# Patient Record
Sex: FEMALE
[De-identification: names, ages, dates, MRNs, and addresses within clinical notes are randomized per-mention and may not be internally consistent; named-entity substitution may affect disease eponyms.]

---

## 2017-11-12 ENCOUNTER — HEALTH MAINTENANCE LETTER (OUTPATIENT)
Age: 32
End: 2017-11-12

## 2020-03-02 ENCOUNTER — HEALTH MAINTENANCE LETTER (OUTPATIENT)
Age: 35
End: 2020-03-02

## 2020-12-14 ENCOUNTER — HEALTH MAINTENANCE LETTER (OUTPATIENT)
Age: 35
End: 2020-12-14

## 2021-04-18 ENCOUNTER — HEALTH MAINTENANCE LETTER (OUTPATIENT)
Age: 36
End: 2021-04-18

## 2021-10-02 ENCOUNTER — HEALTH MAINTENANCE LETTER (OUTPATIENT)
Age: 36
End: 2021-10-02

## 2022-05-14 ENCOUNTER — HEALTH MAINTENANCE LETTER (OUTPATIENT)
Age: 37
End: 2022-05-14

## 2022-09-03 ENCOUNTER — HEALTH MAINTENANCE LETTER (OUTPATIENT)
Age: 37
End: 2022-09-03

## 2023-06-03 ENCOUNTER — HEALTH MAINTENANCE LETTER (OUTPATIENT)
Age: 38
End: 2023-06-03

## 2024-08-15 LAB
EXTERNAL HEPATITIS B SURFACE ANTIGEN: NEGATIVE
EXTERNAL HEPATITIS C AB: NEGATIVE
EXTERNAL RUBELLA QUALITATIVE: NORMAL
EXTERNAL SYPHILIS RPR SCREEN: NORMAL
HIV1 P24 AG SERPL QL IA: NORMAL

## 2024-09-27 ENCOUNTER — CLINICAL SUPPORT (OUTPATIENT)
Dept: GENETICS | Facility: HOSPITAL | Age: 39
End: 2024-09-27
Payer: COMMERCIAL

## 2024-09-27 ENCOUNTER — OFFICE VISIT (OUTPATIENT)
Dept: OBSTETRICS AND GYNECOLOGY | Facility: CLINIC | Age: 39
End: 2024-09-27
Payer: COMMERCIAL

## 2024-09-27 ENCOUNTER — HOSPITAL ENCOUNTER (OUTPATIENT)
Dept: ULTRASOUND IMAGING | Facility: HOSPITAL | Age: 39
Discharge: HOME | End: 2024-09-27
Payer: COMMERCIAL

## 2024-09-27 ENCOUNTER — TRANSCRIBE ORDERS (OUTPATIENT)
Dept: ULTRASOUND IMAGING | Facility: HOSPITAL | Age: 39
End: 2024-09-27
Payer: COMMERCIAL

## 2024-09-27 VITALS
BODY MASS INDEX: 24.56 KG/M2 | DIASTOLIC BLOOD PRESSURE: 73 MMHG | HEART RATE: 82 BPM | HEIGHT: 63 IN | SYSTOLIC BLOOD PRESSURE: 120 MMHG | TEMPERATURE: 98.7 F | WEIGHT: 138.6 LBS

## 2024-09-27 DIAGNOSIS — O28.5 ABNORMAL GENETIC TEST DURING PREGNANCY: ICD-10-CM

## 2024-09-27 DIAGNOSIS — D82.1 DIGEORGE SYNDROME: ICD-10-CM

## 2024-09-27 DIAGNOSIS — O09.529 ANTEPARTUM MULTIGRAVIDA OF ADVANCED MATERNAL AGE: Primary | ICD-10-CM

## 2024-09-27 DIAGNOSIS — R89.8 ABNORMAL GENETIC TEST: ICD-10-CM

## 2024-09-27 DIAGNOSIS — O28.5 ABNORMAL GENETIC TEST DURING PREGNANCY: Primary | ICD-10-CM

## 2024-09-27 DIAGNOSIS — R89.8 ABNORMAL GENETIC TEST: Primary | ICD-10-CM

## 2024-09-27 PROCEDURE — 76801 OB US < 14 WKS SINGLE FETUS: CPT

## 2024-09-27 PROCEDURE — 96040: CPT

## 2024-09-27 RX ORDER — LEVOTHYROXINE SODIUM 150 UG/1
137 TABLET ORAL DAILY
COMMUNITY
Start: 2024-09-17 | End: 2025-09-17

## 2024-09-27 NOTE — PROGRESS NOTES
[facility]  Genetic Counseling Note    Patient Name:  Patrick Leslie  :   1985  KAREL:   2024  MRN:   [id number]  Patient's Age at REGULO: 39 years    Requesting Physician: Jeni Longo MD and Edith Vallecillo MD                                        Referring Diagnosis:  Patrick Leslie is a 39 y.o. female. Patrick is here in consultation for a prenatal diagnosis of abnormal NIPT result: high risk for 22q11.2 deletion.     Pregnancy history:  Estimated Date of Delivery: 3/24/25            GA:14w4d  M[facility]ann pregnancy  Female fetus    OB History          3    Para   2    Term   2            AB        Living   2         SAB        IAB        Ectopic        Molar        Multiple   0    Live Births   2              Ms. Leslie reports no use of fertility treatments, gamete donors, or assistive reproductive technologies to conceive this pregnancy.     Patient medical history:   No past medical history on file.  Ms. Leslie reports no history of diabetes or infertility.     She was diagnosed with papillary thyroid cancer in 2018, and had a thyroidectomy.    Medications:   Current Outpatient Medications   Medication Sig Dispense Refill    ibuprofen (ADVIL,MOTRIN) 800 MG tablet Take 1 tablet by mouth Every 8 (Eight) Hours As Needed for mild pain (1-3). 30 tablet 3    Prenatal Vit-Fe Fumarate-FA (PRENATAL, CLASSIC, VITAMIN) 28-0.8 MG tablet tablet Take 1 tablet by mouth Daily.       No current facility-administered medications for this visit.     Exposures/complications:  Alcohol: no  Cigarettes: no   X-Rays/CT/radiation: no  Illnesses: no  Spotting/bleeding: no  Other exposures: no    Genetic/lab testing already completed during current pregnancy:  Cell free DNA screening: Yes - Tashia Lemus   High risk for 22q11.2 deletion syndrome: 1 in 2 risk (see lab result below)  Low risk for trisomy 21, 13, 18, monosomy X, triploidy  Carrier Screening: no  Amniocentesis: No  Glucose  screening: no  Other testing (CVS, maternal serum screen, etc): no        Imaging:   Ultrasound  Date: 9/27/24, 14w4d gestation  Impression  Live viable early intrauterine pregnancy.  Anterior placenta.  MVP 3.2 cm, which is normal.  EFW 92 g (AC 43%)  Limited early anatomy appears normal including what can be seen of profile, heart, and thymus.    Psychosocial History:   Psychosocial assessment: Ms. Leslie received the NIPT results earlier this week and was understandably upset to learn the pregnancy was at risk for 22q deletion. She carefully explored her options, especially given the uncertainty in the situation. Because the positive predictive value of the test is 50%, and we cannot predict how severe the symptoms would be for an affected child, there are a lot of unknowns at this point. We discussed that amniocentesis would give a certain answer about whether the fetus has 22q deletion, but there is a possibility of further uncertain results from microarray. Ms. Leslie discussed how she wants to plan for her son's futures, given that they have limited local family support. The results of diagnostic testing would help the patient make plans, including whether to continue the pregnancy.                                                        Family History:    A three-generation family history was obtained for the patient and the father of the baby. See attached pedigree.     Of significance, pt has two healthy siblings, each with children. Father with heart disease and hypertension. Mother with hypertension. Maternal first cousin with developmental delay (speech and cognition affected) and possible cerebral palsy.     The father of the baby, Evangelina, is a 47 year old male with a history of hypertention. He has one healthy brother. Father passed away at age 67 due to a subarachnoid hemorrhage; previous history of diabetes and hypertension. Mother with meningioma; thyroid cancer in 40's or 50's and s/p  thyroidectomy.    Maternal race/ethnicity: /  Paternal race/ethnicity: /  Consanguinity: denied      We do not have medical records regarding any of these diagnoses. The history was otherwise unremarkable for heart defects, immune deficiency, cleft palate, developmental delays or intellectual disability, other birth defects, neural tube defects, multiple miscarriages, stillborns or infant deaths, other known genetic conditions and consanguinity.                        Information Discussed:   1) NIPT Results  We discussed Ms. Leslie's NIPT results, which showed that there is a 50% likelihood that the pregnancy is affected by 22q11.2 deletion syndrome.     Non-invasive prenatal testing (NIPT) is a blood test that can report the risk for a fetus to be affected with various conditions. NIPT analyzes cell free DNA from the placenta that is circulating in maternal blood. Cell free NIPT is a screening test. NIPT does not provide a diagnosis of a genetic condition. Occasionally, placental DNA is different from fetal DNA due to confined placental mosaicism. False negatives and false positives are possible.    Confirmatory diagnostic testing should be offered to every patient. Confirmatory testing can be performed during pregnancy or following delivery. Options for diagnostic testing during pregnancy include amniocentesis followed by genetic testing of fetal samples.    2) 22q11.2 Deletion Syndrome  22q11.2 deletion syndrome is caused by a deletion of a small piece of chromosome 22. It is also known by other names, including 22q, DiGeorge syndrome, and velocardiofacial (VCFS) syndrome.  Around 1 in 2,000 people have 22q.     Causes of 22q  22q11.2 deletion syndrome occurs because a part of the DNA is missing. Typically, an individual has 23 pairs of chromosomes, for a total of 46 chromosomes. Each chromosome has a short arm, called p, and a long arm, called q. If someone has too much or too little  genetic material on the chromosomes, it can cause health problems. In 22q11.2 deletion syndrome, there is a piece of genetic material on the long arm (q) of chromosome 22 that has been deleted. This deletion causes the typical health concerns associated with 22q.    The exact area of chromosome 22 that is deleted can vary. The edges of the deletion can be determined by microarray testing. For most people with 22q, around 40 genes are missing.    There is nothing that parents did to cause their baby to have 22q. The deletion occurs randomly in the egg or sperm cell. 90% of the time, the deletion is new in the patient (de lance). 10% of the time, the deletion is inherited from a parent. Sometimes, people who have 22q may not know that they are affected until their child is diagnosed.    Maternal age is not a risk factor for having a pregnancy affected by 22q.    Symptoms  The features of 22q vary for each person, even within the same family. Each person with 22q is different. We can make some predictions about their health, but we cannot predict exactly what their life will be like.    Common features of 22q include  Congenital heart defects, especially conotruncal malformations including truncus arteriosus and tetralogy of Fallot (64% of individuals)  Cleft palate and other palatal findings (67% of individuals)  Immune deficiency (77% of individuals)  Characteristic facial features  Developmental delay and learning difficulties (70-90% of individuals)  Hearing loss  Low calcium levels in the blood  Feeding difficulties  Genitourinary tract/kidney anomalies (16% of individuals)    People with 22q are more likely to have psychiatric concerns and autoimmune disorders. 22q can also cause GI concerns, eye differences, and central nervous system abnormalities.    Resources  International 22q11.2 Foundation - www.22q.org     3) Genetic Testing  Diagnostic testing is available prenatally with amniocentesis followed by  microarray. Microarray is a genetic test that detects copy number variants (CNVs), which are deleted or duplicated sections of DNA. Microarray can detect aneuploidy, triploidy, and small deletions or duplications. Microarray does not detect balanced chromosomal rearrangements. Microarray can reveal regions of homozygosity. Regions of homozygosity could indicate uniparental disomy or shared parental ancestry, or suggest a region for further investigation due to the potential for unmasking recessive alleles.  A microarray can return positive, negative, or uncertain results. Positive results indicate that a pathogenic or likely pathogenic variant was detected. Negative, or normal, results indicate that no pathogenic or likely pathogenic variants were detected. Uncertain results indicate that one or more variants of uncertain significance (VUS) were detected. A VUS is a variant that lacks sufficient evidence to classify it as either pathogenic or benign. In the future, VUS results may be reclassified to pathogenic or benign.   Turnaround time for microarray is approximately 10-21 calendar days. Testing may be delayed due to specimen type and culturing requirements.    An amniocentesis is a procedure for diagnostic fetal genetic testing or to measure certain analyte levels. Amniocentesis can be performed after 15-16 weeks of pregnancy, when the chorion and amnion have fused. During an amniocentesis, a provider inserts a needle through the pregnant parent's abdomen to collect amniotic fluid. Amniotic fluid contains fetal cells and other components that can give information about fetal health. Ultrasound is used to guide the positioning of the needle. The risk of complications, including interruption of pregnancy and infection following an amniocentesis is approximately 1 in 900.   Patrick Leslie is still considering amniocentesis at this time.  If genetic testing is not pursued during pregnancy, genetic testing after  the baby's birth is recommended to inform healthcare decisions.    Follow-up Plan:    1) Ms. Leslie will consider amniocentesis, and would like to get on the schedule for the procedure.   2) If she elects to pursue amniocentesis, chromosomal microarray is recommended. She did not decide today whether or not she would like FISH.  3) If diagnostic genetic testing is not pursued during pregnancy, genetic testing after the baby's birth is recommended. Chromosomal microarray should be ordered.    Please feel free to contact me with additional questions at [phone].    I personally spent 35 minutes in face-to-face time with this patient. I provided genetic counseling services, including obtaining a structured family history, analysis of genetic and other risks, counseling of the patient and her family regarding abnormal NIPT results, review of medical information, review of previous test results and discussion of genetic testing options.    Sue Davis MS, [facility]  Genetic Counselor  [facility]

## 2024-09-30 ENCOUNTER — TELEPHONE (OUTPATIENT)
Dept: OBGYN | Facility: CLINIC | Age: 39
End: 2024-09-30
Payer: COMMERCIAL

## 2024-09-30 NOTE — TELEPHONE ENCOUNTER
Assessment:    Telma Amos    When was the first day of your last period? 24 - pt has had abnormal NIPT with amniocentesis scheduled 10/9.  Pt lives in [place] - Pt records via care everywhere.      Massiel Qureshi RN     Sent to  [facility]

## 2024-10-01 DIAGNOSIS — D82.1 DIGEORGE SYNDROME: Primary | ICD-10-CM

## 2024-10-03 ENCOUNTER — TRANSCRIBE ORDERS (OUTPATIENT)
Dept: ULTRASOUND IMAGING | Facility: HOSPITAL | Age: 39
End: 2024-10-03
Payer: COMMERCIAL

## 2024-10-03 DIAGNOSIS — R89.8 ABNORMAL GENETIC TEST: Primary | ICD-10-CM

## 2024-10-09 ENCOUNTER — OFFICE VISIT (OUTPATIENT)
Dept: OBSTETRICS AND GYNECOLOGY | Facility: CLINIC | Age: 39
End: 2024-10-09
Payer: COMMERCIAL

## 2024-10-09 ENCOUNTER — HOSPITAL ENCOUNTER (OUTPATIENT)
Dept: ULTRASOUND IMAGING | Facility: HOSPITAL | Age: 39
Discharge: HOME | End: 2024-10-09
Admitting: OBSTETRICS & GYNECOLOGY
Payer: COMMERCIAL

## 2024-10-09 VITALS
SYSTOLIC BLOOD PRESSURE: 117 MMHG | TEMPERATURE: 98.4 F | WEIGHT: 140.8 LBS | OXYGEN SATURATION: 99 % | HEART RATE: 98 BPM | DIASTOLIC BLOOD PRESSURE: 67 MMHG | HEIGHT: 63 IN | BODY MASS INDEX: 24.95 KG/M2

## 2024-10-09 DIAGNOSIS — D82.1 DIGEORGE SYNDROME: ICD-10-CM

## 2024-10-09 DIAGNOSIS — O09.529 ANTEPARTUM MULTIGRAVIDA OF ADVANCED MATERNAL AGE: ICD-10-CM

## 2024-10-09 DIAGNOSIS — O28.5 ABNORMAL GENETIC TEST DURING PREGNANCY: Primary | ICD-10-CM

## 2024-10-09 DIAGNOSIS — R89.8 ABNORMAL GENETIC TEST: ICD-10-CM

## 2024-10-09 PROCEDURE — 59000 AMNIOCENTESIS DIAGNOSTIC: CPT

## 2024-10-09 PROCEDURE — 76946 ECHO GUIDE FOR AMNIOCENTESIS: CPT

## 2024-10-09 PROCEDURE — 76805 OB US >/= 14 WKS SNGL FETUS: CPT

## 2024-10-09 RX ORDER — SERTRALINE HYDROCHLORIDE 25 MG/1
25 TABLET, FILM COATED ORAL
COMMUNITY
Start: 2024-10-07

## 2024-10-09 NOTE — PROGRESS NOTES
Pt reports that she is doing well and denies vaginal bleeding, cramping, contractions or LOF at this time. Not feeling fetal movement at this point in pregnancy. Reviewed when to call OB office or present to L&D for evaluation with symptoms such as decreased fetal movement, vaginal bleeding, LOF or ctxs. Pt verbalized understanding. Denies HA, visual changes or epigastric pain. Denies any additional complaints at time of appointment. Next OB appointment scheduled for: early November.     Patrick is undecided on ultrasound guided amniocentesis at this time. Would like to speak with Dr. Longo before making a decision.     Vitals:    10/09/24 1215   BP: 117/67   Pulse: 98   Temp: 98.4 °F (36.9 °C)   SpO2: 99%

## 2024-10-09 NOTE — PROGRESS NOTES
Patient decided to proceed with ultrasound guided amniocentesis with Dr. Longo. Consent forms signed and patient given copy of amniocentesis post instructions. Patient ambulated out of [facility] off with  with no issues or concerns.

## 2024-10-09 NOTE — PROGRESS NOTES
MATERNAL FETAL MEDICINE Consult Note    Dear Dr Edith Vallecillo MD:    Thank you for your kind referral of Patrick Leslie.  As you know, she is a 39 y.o.   at 16w2 d.  (Estimated Date of Delivery: 3/24/25). This is a consult.      Her antepartum course is complicated by:  +DiGeorge on NIPT  AMA    Aneuploidy Screening: +22q (1in2)    HPI: Today, she denies headache, blurry vision, RUQ pain. No vaginal bleeding, no contractions.     Review of History:  Past Medical History:   Diagnosis Date    Migraine     Polycystic ovary syndrome     Thyroid cancer 2018    Follows with Dr. Edwige Christine at [facility]     Past Surgical History:   Procedure Laterality Date    THYROIDECTOMY  2018       Social History     Socioeconomic History    Marital status:    Tobacco Use    Smoking status: Never     Passive exposure: Never   Vaping Use    Vaping status: Never Used   Substance and Sexual Activity    Alcohol use: No    Drug use: No    Sexual activity: Yes     Partners: Male     Family History   Problem Relation Age of Onset    Hypertension Mother     Hypertension Father     Coronary artery disease Father     Heart disease Father       Allergies   Allergen Reactions    Latex Rash     Category: Allergy;      Current Outpatient Medications on File Prior to Visit   Medication Sig Dispense Refill    levothyroxine (SYNTHROID, LEVOTHROID) 150 MCG tablet Take 137 mcg by mouth Daily.      Prenatal Vit-Fe Fumarate-FA (PRENATAL, CLASSIC, VITAMIN) 28-0.8 MG tablet tablet Take 1 tablet by mouth Daily.      sertraline (ZOLOFT) 25 MG tablet 1 tablet. HAS NOT STARTED YET      [DISCONTINUED] ibuprofen (ADVIL,MOTRIN) 800 MG tablet Take 1 tablet by mouth Every 8 (Eight) Hours As Needed for mild pain (1-3). 30 tablet 3     No current facility-administered medications on file prior to visit.        Past obstetric, gynecological, medical, surgical, family and social history reviewed.  Relevant lab work and imaging reviewed.    Review of  "systems  Constitutional:  denies fever, chills, malaise.   ENT/Mouth:  denies sore throat, tinnitus  Eyes: denies vision changes/pain  CV:  denies chest pain  Respiratory:  denies cough/SOB  GI:  denies N/V, diarrhea, abdominal pain.    :   denies dysuria  Skin:  denies lesions or pruritus   Neuro:  denies weakness, focal neurologic symptoms    Vitals:    10/09/24 1215   BP: 117/67   BP Location: Right arm   Patient Position: Sitting   Pulse: 98   Temp: 98.4 °F (36.9 °C)   TempSrc: Temporal   SpO2: 99%   Weight: 63.9 kg (140 lb 12.8 oz)   Height: 160 cm (63\")       PHYSICAL EXAM   GENERAL: Not in acute distress, AAOx3, pleasant  CARDIO: regular rate and rhythm  PULM: symmetric chest rise, speaking in complete sentences without difficulty  NEURO: awake, alert and oriented to person, place, and time  ABDOMINAL: No fundal tenderness, no rebound or guarding, gravid  EXTREMITIES: no bilateral lower extremity edema/tenderness  SKIN: Warm, well-perfused      ULTRASOUND   Please view full ultrasound note on Imaging tab in ViewPoint.  Breech presentation.  Anterior placenta.   MVP 4.5 cm, which is normal.    g (AC 33%)  Normal appearing limited early anatomy.  Visualized heart views appear normal.   Amniocentesis performed under ultrasound guidance without complication.      ASSESSMENT/COUNSELIN y.o.   at 16w2d.  (Estimated Date of Delivery: 3/24/25).       -Pregnancy  [ X ] stable  [   ] improving [  ] worsening    Diagnoses and all orders for this visit:    1. Abnormal genetic test during pregnancy (Primary)    2. DiGeorge syndrome    3. Antepartum multigravida of advanced maternal age      22q/DiGeorge syndrome suspected on NIPT:  Previously counseled by both me and Sue.    Progress Notes by Sue Davis (2024 14:30)     We revisited amnio today.  Patient was torn but ultimately decided for her anxiety she needed to know one way or another.  She said unless there is a severe abnormality " seen with ultrasound, it would not change their management and they planned on continuing the pregnancy.  We discussed further diagnostic testing with amniocentesis which has a 1/900 risk of bleeding, infection, PPROM/fetal loss.  It is a safe procedure and we are happy to do it if she desires. She does desire this today so it was done.     Amniocentesis:  After discussion, the patient would like to proceed with amniocentesis.    She is Rh + and HIV neg  Time out was performed.    She was prepped and draped in the usual sterile manner.    An ultrasound was preformed to find an appropriate pocket away from the baby, cord, and placenta if possible for optimal sampling.  She had an anterior placenta so we did have to go through the side of the placenta away from the cord insertion.  Once a window was found, a 22-gauge 9 cm ECHOtip needle was inserted into the fetal amniotic sac under ultrasound guidance.    Stylet was removed and 29 cc of amniotic fluid was aspirated with 3 10 cc syringes.  Clear fluid was obtained.  The hole was covered to prevent suction and the needle was removed under UC guidance.  No leaking/bleeding at end of procedure.    Active fetus with normal fetal heart tones and no leaking/streaming after procedure.      Baby and patient tolerated procedure well without issue.  No complications.      She will need to see us for Level 2 anatomy, we will call her when her microarray comes back.      Summary of Plan  -Follow up 4 weeks for anatomy  -Will call with amnio results/further care planning.      Follow-up: 4 weeks anatomy.      Thank you for the consult and opportunity to care for this patient.  Please feel free to reach out with any questions or concerns.      I spent 60 minutes caring for this patient on this date of service. This time includes time spent by me in the following activities: preparing for the visit, reviewing tests, obtaining and/or reviewing a separately obtained history,  performing a medically appropriate examination and/or evaluation, counseling and educating the patient/family/caregiver and independently interpreting results and communicating that information with the patient/family/caregiver with greater than 50% spent in counseling and coordination of care.       I spent 6 minutes on the separately reported service of US imaging not included in the time used to support the E/M service also reported today.      Jeni Longo MD [facility] [place]  Maternal Fetal Medicine-[facility]  Office: [phone]  [email or web address]

## 2024-10-09 NOTE — LETTER
2024     Edith Vallecillo MD  [address]  [facility] [address]  [facility] [address]    Patient: Patrick Leslie   YOB: 1985   Date of Visit: 10/9/2024       Dear Edith Vallecillo MD    Patrick Leslie was in my office today. Below is a copy of my note.    If you have questions, please do not hesitate to call me. I look forward to following Patrick along with you.         Sincerely,        Jeni Longo MD    MATERNAL FETAL MEDICINE Consult Note    Dear Dr Edith Vallecillo MD:    Thank you for your kind referral of Patrick Lselie.  As you know, she is a 39 y.o.   at 16w2 d.  (Estimated Date of Delivery: 3/24/25). This is a consult.      Her antepartum course is complicated by:  +DiGeorge on NIPT  AMA    Aneuploidy Screening: +22q (1in2)    HPI: Today, she denies headache, blurry vision, RUQ pain. No vaginal bleeding, no contractions.     Review of History:  Past Medical History:   Diagnosis Date   • Migraine    • Polycystic ovary syndrome    • Thyroid cancer 2018    Follows with Dr. Edwige Christine at [facility]     Past Surgical History:   Procedure Laterality Date   • THYROIDECTOMY  2018       Social History     Socioeconomic History   • Marital status:    Tobacco Use   • Smoking status: Never     Passive exposure: Never   Vaping Use   • Vaping status: Never Used   Substance and Sexual Activity   • Alcohol use: No   • Drug use: No   • Sexual activity: Yes     Partners: Male     Family History   Problem Relation Age of Onset   • Hypertension Mother    • Hypertension Father    • Coronary artery disease Father    • Heart disease Father       Allergies   Allergen Reactions   • Latex Rash     Category: Allergy;      Current Outpatient Medications on File Prior to Visit   Medication Sig Dispense Refill   • levothyroxine (SYNTHROID, LEVOTHROID) 150 MCG tablet Take 137 mcg by mouth Daily.     • Prenatal Vit-Fe Fumarate-FA (PRENATAL, CLASSIC, VITAMIN) 28-0.8 MG tablet tablet Take 1  "tablet by mouth Daily.     • sertraline (ZOLOFT) 25 MG tablet 1 tablet. HAS NOT STARTED YET     • [DISCONTINUED] ibuprofen (ADVIL,MOTRIN) 800 MG tablet Take 1 tablet by mouth Every 8 (Eight) Hours As Needed for mild pain (1-3). 30 tablet 3     No current facility-administered medications on file prior to visit.        Past obstetric, gynecological, medical, surgical, family and social history reviewed.  Relevant lab work and imaging reviewed.    Review of systems  Constitutional:  denies fever, chills, malaise.   ENT/Mouth:  denies sore throat, tinnitus  Eyes: denies vision changes/pain  CV:  denies chest pain  Respiratory:  denies cough/SOB  GI:  denies N/V, diarrhea, abdominal pain.    :   denies dysuria  Skin:  denies lesions or pruritus   Neuro:  denies weakness, focal neurologic symptoms    Vitals:    10/09/24 1215   BP: 117/67   BP Location: Right arm   Patient Position: Sitting   Pulse: 98   Temp: 98.4 °F (36.9 °C)   TempSrc: Temporal   SpO2: 99%   Weight: 63.9 kg (140 lb 12.8 oz)   Height: 160 cm (63\")       PHYSICAL EXAM   GENERAL: Not in acute distress, AAOx3, pleasant  CARDIO: regular rate and rhythm  PULM: symmetric chest rise, speaking in complete sentences without difficulty  NEURO: awake, alert and oriented to person, place, and time  ABDOMINAL: No fundal tenderness, no rebound or guarding, gravid  EXTREMITIES: no bilateral lower extremity edema/tenderness  SKIN: Warm, well-perfused      ULTRASOUND   Please view full ultrasound note on Imaging tab in ViewPoint.  Breech presentation.  Anterior placenta.   MVP 4.5 cm, which is normal.    g (AC 33%)  Normal appearing limited early anatomy.  Visualized heart views appear normal.   Amniocentesis performed under ultrasound guidance without complication.      ASSESSMENT/COUNSELIN y.o.   at 16w2d.  (Estimated Date of Delivery: 3/24/25).       -Pregnancy  [ X ] stable  [   ] improving [  ] worsening    Diagnoses and all orders for this " visit:    1. Abnormal genetic test during pregnancy (Primary)    2. DiGeorge syndrome    3. Antepartum multigravida of advanced maternal age      22q/DiGeorge syndrome suspected on NIPT:  Previously counseled by both me and Sue.    Progress Notes by Sue Davis (09/27/2024 14:30)     We revisited amnio today.  Patient was torn but ultimately decided for her anxiety she needed to know one way or another.  She said unless there is a severe abnormality seen with ultrasound, it would not change their management and they planned on continuing the pregnancy.  We discussed further diagnostic testing with amniocentesis which has a 1/900 risk of bleeding, infection, PPROM/fetal loss.  It is a safe procedure and we are happy to do it if she desires. She does desire this today so it was done.     Amniocentesis:  After discussion, the patient would like to proceed with amniocentesis.    She is Rh + and HIV neg  Time out was performed.    She was prepped and draped in the usual sterile manner.    An ultrasound was preformed to find an appropriate pocket away from the baby, cord, and placenta if possible for optimal sampling.  She had an anterior placenta so we did have to go through the side of the placenta away from the cord insertion.  Once a window was found, a 22-gauge 9 cm ECHOtip needle was inserted into the fetal amniotic sac under ultrasound guidance.    Stylet was removed and 29 cc of amniotic fluid was aspirated with 3 10 cc syringes.  Clear fluid was obtained.  The hole was covered to prevent suction and the needle was removed under UC guidance.  No leaking/bleeding at end of procedure.    Active fetus with normal fetal heart tones and no leaking/streaming after procedure.      Baby and patient tolerated procedure well without issue.  No complications.      She will need to see us for Level 2 anatomy, we will call her when her microarray comes back.      Summary of Plan  -Follow up 4 weeks for anatomy  -Will  call with amnio results/further care planning.      Follow-up: 4 weeks anatomy.      Thank you for the consult and opportunity to care for this patient.  Please feel free to reach out with any questions or concerns.      I spent 60 minutes caring for this patient on this date of service. This time includes time spent by me in the following activities: preparing for the visit, reviewing tests, obtaining and/or reviewing a separately obtained history, performing a medically appropriate examination and/or evaluation, counseling and educating the patient/family/caregiver and independently interpreting results and communicating that information with the patient/family/caregiver with greater than 50% spent in counseling and coordination of care.       I spent 6 minutes on the separately reported service of US imaging not included in the time used to support the E/M service also reported today.      Jeni Longo MD FACOG  Maternal Fetal Medicine-[facility]  Office: [phone]  [email or web address]

## 2024-10-21 ENCOUNTER — TELEPHONE (OUTPATIENT)
Dept: OBSTETRICS AND GYNECOLOGY | Facility: CLINIC | Age: 39
End: 2024-10-21
Payer: COMMERCIAL

## 2024-10-21 NOTE — TELEPHONE ENCOUNTER
Called to discuss with patient her normal microarray results.  Congratulated!  Discussed that this means the baby does not have 22q deletion/DiGeorge and that all the duplications/deletions able to be detected by microarray were negative.  She was excited and had no further questions.  Will see for anatomy in a few weeks.    Jeni Longo MD Merg[facility] with [facility]  Maternal Fetal Medicine-[facility]  Office: [phone]  [email or web address]

## 2024-10-28 ENCOUNTER — TRANSCRIBE ORDERS (OUTPATIENT)
Dept: ULTRASOUND IMAGING | Facility: HOSPITAL | Age: 39
End: 2024-10-28
Payer: COMMERCIAL

## 2024-10-28 DIAGNOSIS — R89.8 ABNORMAL GENETIC TEST: Primary | ICD-10-CM

## 2024-11-04 ENCOUNTER — HOSPITAL ENCOUNTER (OUTPATIENT)
Dept: ULTRASOUND IMAGING | Facility: HOSPITAL | Age: 39
Discharge: HOME | End: 2024-11-04
Admitting: OBSTETRICS & GYNECOLOGY
Payer: COMMERCIAL

## 2024-11-04 ENCOUNTER — OFFICE VISIT (OUTPATIENT)
Dept: OBSTETRICS AND GYNECOLOGY | Facility: CLINIC | Age: 39
End: 2024-11-04
Payer: COMMERCIAL

## 2024-11-04 VITALS
BODY MASS INDEX: 25.26 KG/M2 | HEART RATE: 99 BPM | DIASTOLIC BLOOD PRESSURE: 67 MMHG | SYSTOLIC BLOOD PRESSURE: 107 MMHG | WEIGHT: 142.6 LBS | TEMPERATURE: 97.8 F

## 2024-11-04 DIAGNOSIS — R89.8 ABNORMAL GENETIC TEST: ICD-10-CM

## 2024-11-04 DIAGNOSIS — Z36.2 ANTENATAL SCREENING BASED ON AMNIOCENTESIS: ICD-10-CM

## 2024-11-04 DIAGNOSIS — O09.529 ANTEPARTUM MULTIGRAVIDA OF ADVANCED MATERNAL AGE: Primary | ICD-10-CM

## 2024-11-04 PROCEDURE — 76811 OB US DETAILED SNGL FETUS: CPT | Performed by: OBSTETRICS & GYNECOLOGY

## 2024-11-04 PROCEDURE — 76811 OB US DETAILED SNGL FETUS: CPT

## 2024-11-04 PROCEDURE — 99213 OFFICE O/P EST LOW 20 MIN: CPT | Performed by: OBSTETRICS & GYNECOLOGY

## 2024-11-04 NOTE — PROGRESS NOTES
Pt reports that she is doing well and denies vaginal bleeding, cramping, contractions or LOF at this time. Reports starting to feel fetal movement. Reviewed when to call OB office or present to L&D for evaluation with symptoms such as decreased fetal movement, vaginal bleeding, LOF or ctxs. Pt verbalized understanding. Reports occasional HAs that improve with Tylenol, denies visual changes or epigastric pain. Denies any additional complaints at time of appointment. Next OB appointment scheduled for 11/5.    Vitals:    11/04/24 1007   BP: 107/67   Pulse: 99   Temp: 97.8 °F (36.6 °C)

## 2024-11-04 NOTE — LETTER
2024     Edith Vallecillo MD  [address]  [facility] [address]  [facility] [address]    Patient: Patrick Leslie   YOB: 1985   Date of Visit: 2024       Dear Edith Vallecillo MD    Patrick Leslie was in my office today. Below is a copy of my note.    If you have questions, please do not hesitate to call me. I look forward to following Patrick along with you.         Sincerely,        Jeni Longo MD      MATERNAL FETAL MEDICINE Consult Note    Dear Dr Edith Vallecillo MD:    Thank you for your kind referral of Patrick Leslie.  As you know, she is a 39 y.o.   at 20w0d.  (Estimated Date of Delivery: 3/24/25). This is a consult.      Her antepartum course is complicated by:  +DiGeorge on NIPT--microarray negative  AMA    Aneuploidy Screening: +22q (1in2), Microarray on amniocentesis negative    HPI: Today, she denies headache, blurry vision, RUQ pain. No vaginal bleeding, no contractions.     Review of History:  Past Medical History:   Diagnosis Date   • Migraine    • Polycystic ovary syndrome    • Thyroid cancer 2018    Follows with Dr. Edwige Christine at [facility]     Past Surgical History:   Procedure Laterality Date   • THYROIDECTOMY  2018       Social History     Socioeconomic History   • Marital status:    Tobacco Use   • Smoking status: Never     Passive exposure: Never   Vaping Use   • Vaping status: Never Used   Substance and Sexual Activity   • Alcohol use: No   • Drug use: No   • Sexual activity: Yes     Partners: Male     Family History   Problem Relation Age of Onset   • Hypertension Mother    • Hypertension Father    • Coronary artery disease Father    • Heart disease Father       Allergies   Allergen Reactions   • Latex Rash     Category: Allergy;      Current Outpatient Medications on File Prior to Visit   Medication Sig Dispense Refill   • levothyroxine (SYNTHROID, LEVOTHROID) 150 MCG tablet Take 137 mcg by mouth Daily.     • Prenatal Vit-Fe Fumarate-FA  (PRENATAL, CLASSIC, VITAMIN) 28-0.8 MG tablet tablet Take 1 tablet by mouth Daily.     • sertraline (ZOLOFT) 25 MG tablet 1 tablet. HAS NOT STARTED YET       No current facility-administered medications on file prior to visit.        Past obstetric, gynecological, medical, surgical, family and social history reviewed.  Relevant lab work and imaging reviewed.    Review of systems  Constitutional:  denies fever, chills, malaise.   ENT/Mouth:  denies sore throat, tinnitus  Eyes: denies vision changes/pain  CV:  denies chest pain  Respiratory:  denies cough/SOB  GI:  denies N/V, diarrhea, abdominal pain.    :   denies dysuria  Skin:  denies lesions or pruritus   Neuro:  denies weakness, focal neurologic symptoms    Vitals:    24 1007   BP: 107/67   BP Location: Right arm   Patient Position: Sitting   Pulse: 99   Temp: 97.8 °F (36.6 °C)   TempSrc: Temporal   Weight: 64.7 kg (142 lb 9.6 oz)       PHYSICAL EXAM   GENERAL: Not in acute distress, AAOx3, pleasant  CARDIO: regular rate and rhythm  PULM: symmetric chest rise, speaking in complete sentences without difficulty  NEURO: awake, alert and oriented to person, place, and time  ABDOMINAL: No fundal tenderness, no rebound or guarding, gravid  EXTREMITIES: no bilateral lower extremity edema/tenderness  SKIN: Warm, well-perfused      ULTRASOUND   Please view full ultrasound note on Imaging tab in ViewPoint.  Breech presentation.  Anterior placenta.  MVP 5 cm, which is normal.   g (AC 34%)  Anatomy appears normal and was completed today.     Transabdominal CL >3.5 cm, which is normal.     ASSESSMENT/COUNSELIN y.o.   at 20w0d.  (Estimated Date of Delivery: 3/24/25).     -Pregnancy  [ X ] stable  [   ] improving [  ] worsening    Diagnoses and all orders for this visit:    1. Antepartum multigravida of advanced maternal age (Primary)    2.  screening based on amniocentesis        22q/DiGeorge syndrome suspected on NIPT--AMNIOCENTESIS  NEGATIVE:  Previously counseled by both me and Sue.    Progress Notes by Susan Sue (09/27/2024 14:30)     She is s/p normal amniocentesis--ruling out 22q11 deletion--she was again congratulated.  Anatomy appeared normal today.        Summary of Plan  -Every 4 weeks growth (Primary OB)  -Weekly ANFS at 32 weeks--primary OB  -Baby ASA recommended--ordered  -s/p amnio with normal microarray  -Careful attention to signs/symptoms of preE and fetal movement  -Delivery 39 weeks unless indicated sooner    Follow-up: No follow up with MFM scheduled--happy to see at request of patient or primary OB    Thank you for the consult and opportunity to care for this patient.  Please feel free to reach out with any questions or concerns.      I spent 25 minutes caring for this patient on this date of service. This time includes time spent by me in the following activities: preparing for the visit, reviewing tests, obtaining and/or reviewing a separately obtained history, performing a medically appropriate examination and/or evaluation, counseling and educating the patient/family/caregiver and independently interpreting results and communicating that information with the patient/family/caregiver with greater than 50% spent in counseling and coordination of care.       I spent 6 minutes on the separately reported service of US imaging not included in the time used to support the E/M service also reported today.      Jeni Longo MD FACOG  Maternal Fetal Medicine-[facility]  Office: [phone]  [email or web address]

## 2024-11-04 NOTE — PROGRESS NOTES
MATERNAL FETAL MEDICINE Consult Note    Dear Dr Edith Vallecillo MD:    Thank you for your kind referral of Patrick Leslie.  As you know, she is a 39 y.o.   at 20w0d.  (Estimated Date of Delivery: 3/24/25). This is a consult.      Her antepartum course is complicated by:  +DiGeorge on NIPT--microarray negative  AMA    Aneuploidy Screening: +22q (1in2), Microarray on amniocentesis negative    HPI: Today, she denies headache, blurry vision, RUQ pain. No vaginal bleeding, no contractions.     Review of History:  Past Medical History:   Diagnosis Date    Migraine     Polycystic ovary syndrome     Thyroid cancer 2018    Follows with Dr. Edwige Christine at [facility]     Past Surgical History:   Procedure Laterality Date    THYROIDECTOMY  2018       Social History     Socioeconomic History    Marital status:    Tobacco Use    Smoking status: Never     Passive exposure: Never   Vaping Use    Vaping status: Never Used   Substance and Sexual Activity    Alcohol use: No    Drug use: No    Sexual activity: Yes     Partners: Male     Family History   Problem Relation Age of Onset    Hypertension Mother     Hypertension Father     Coronary artery disease Father     Heart disease Father       Allergies   Allergen Reactions    Latex Rash     Category: Allergy;      Current Outpatient Medications on File Prior to Visit   Medication Sig Dispense Refill    levothyroxine (SYNTHROID, LEVOTHROID) 150 MCG tablet Take 137 mcg by mouth Daily.      Prenatal Vit-Fe Fumarate-FA (PRENATAL, CLASSIC, VITAMIN) 28-0.8 MG tablet tablet Take 1 tablet by mouth Daily.      sertraline (ZOLOFT) 25 MG tablet 1 tablet. HAS NOT STARTED YET       No current facility-administered medications on file prior to visit.        Past obstetric, gynecological, medical, surgical, family and social history reviewed.  Relevant lab work and imaging reviewed.    Review of systems  Constitutional:  denies fever, chills, malaise.   ENT/Mouth:  denies sore throat,  tinnitus  Eyes: denies vision changes/pain  CV:  denies chest pain  Respiratory:  denies cough/SOB  GI:  denies N/V, diarrhea, abdominal pain.    :   denies dysuria  Skin:  denies lesions or pruritus   Neuro:  denies weakness, focal neurologic symptoms    Vitals:    24 1007   BP: 107/67   BP Location: Right arm   Patient Position: Sitting   Pulse: 99   Temp: 97.8 °F (36.6 °C)   TempSrc: Temporal   Weight: 64.7 kg (142 lb 9.6 oz)       PHYSICAL EXAM   GENERAL: Not in acute distress, AAOx3, pleasant  CARDIO: regular rate and rhythm  PULM: symmetric chest rise, speaking in complete sentences without difficulty  NEURO: awake, alert and oriented to person, place, and time  ABDOMINAL: No fundal tenderness, no rebound or guarding, gravid  EXTREMITIES: no bilateral lower extremity edema/tenderness  SKIN: Warm, well-perfused      ULTRASOUND   Please view full ultrasound note on Imaging tab in ViewPoint.  Breech presentation.  Anterior placenta.  MVP 5 cm, which is normal.   g (AC 34%)  Anatomy appears normal and was completed today.     Transabdominal CL >3.5 cm, which is normal.     ASSESSMENT/COUNSELIN y.o.   at 20w0d.  (Estimated Date of Delivery: 3/24/25).     -Pregnancy  [ X ] stable  [   ] improving [  ] worsening    Diagnoses and all orders for this visit:    1. Antepartum multigravida of advanced maternal age (Primary)    2.  screening based on amniocentesis        22q/DiGeorge syndrome suspected on NIPT--AMNIOCENTESIS NEGATIVE:  Previously counseled by both me and Sue.    Progress Notes by Sue Davis (2024 14:30)     She is s/p normal amniocentesis--ruling out 22q11 deletion--she was again congratulated.  Anatomy appeared normal today.        Summary of Plan  -Every 4 weeks growth (Primary OB)  -Weekly ANFS at 32 weeks--primary OB  -Baby ASA recommended--ordered  -s/p amnio with normal microarray  -Careful attention to signs/symptoms of preE and fetal  movement  -Delivery 39 weeks unless indicated sooner    Follow-up: No follow up with MFM scheduled--happy to see at request of patient or primary OB    Thank you for the consult and opportunity to care for this patient.  Please feel free to reach out with any questions or concerns.      I spent 25 minutes caring for this patient on this date of service. This time includes time spent by me in the following activities: preparing for the visit, reviewing tests, obtaining and/or reviewing a separately obtained history, performing a medically appropriate examination and/or evaluation, counseling and educating the patient/family/caregiver and independently interpreting results and communicating that information with the patient/family/caregiver with greater than 50% spent in counseling and coordination of care.       I spent 6 minutes on the separately reported service of US imaging not included in the time used to support the E/M service also reported today.      Jeni Longo MD FACOG  Maternal Fetal Medicine-[facility]  Office: [phone]  [email or web address]

## 2025-02-25 LAB — EXTERNAL GROUP B STREP ANTIGEN: NEGATIVE

## 2025-03-16 ENCOUNTER — HOSPITAL ENCOUNTER (EMERGENCY)
Facility: HOSPITAL | Age: 40
Discharge: HOME | End: 2025-03-16
Attending: OBSTETRICS & GYNECOLOGY | Admitting: OBSTETRICS & GYNECOLOGY
Payer: COMMERCIAL

## 2025-03-16 PROCEDURE — 59025 FETAL NON-STRESS TEST: CPT

## 2025-03-16 PROCEDURE — 99284 EMERGENCY DEPT VISIT MOD MDM: CPT | Performed by: OBSTETRICS & GYNECOLOGY

## 2025-03-16 NOTE — OBED NOTES
ERMELINDA Note OB    Patient Name: Patrick Leslie  YOB: 1985  MRN: [id number]  Admission Date: 3/16/2025 12:24 PM  Date of Service: 3/16/2025    Chief Complaint: Decreased Fetal Movement (ERMELINDA - pt reports DFM today. She reports intermittent contractions, denies leaking fluid and vaginal bleeding.)        Subjective     Patrick Leslie is a 39 y.o. female  at 38w6d with Estimated Date of Delivery: 3/24/25 who presents with the chief complaint listed above.  Reports she has not felt the baby move quite as much the last couple of days but has been decreased noticeably since this morning, she does admit the baby was noticeably moving on her way here.  Patient reports that her last cervical exam in the office was 1 to 2 cm and 50% effaced.  Patient has an induction scheduled in 2 days  Post drinking 6 ounces of [employer] baby quite active with multiple accelerations     She sees Edith Vallecillo MD for her prenatal care. Her pregnancy has been complicated by: AMA, history of thyroid cancer, history of PCOS, migraines    She describes fetal movement as normal.  She denies rupture of membranes.  She denies vaginal bleeding. She  is on occasion  feeling contractions.        Objective   There are no active problems to display for this patient.       OB History    Para Term  AB Living   3 2 2 0 0 2   SAB IAB Ectopic Molar Multiple Live Births   0 0 0 0 0 2      # Outcome Date GA Lbr Hema/2nd Weight Sex Type Anes PTL Lv   3 Current            2 Term 16 38w3d 00:38 / 00:53 3516 g (7 lb 12 oz) M Vag-Spont EPI N MENDEZ      Name: ALEXUS LESLIE      Apgar1: 8  Apgar5: 9   1 Term 14 38w0d  3232 g (7 lb 2 oz) M Vag-Spont EPI N MENDEZ        Past Medical History:   Diagnosis Date    Migraine     Polycystic ovary syndrome     Thyroid cancer 2018    Follows with Dr. Edwige Christine at [facility]       Past Surgical History:   Procedure Laterality Date    THYROIDECTOMY  2018       No  current facility-administered medications on file prior to encounter.     Current Outpatient Medications on File Prior to Encounter   Medication Sig Dispense Refill    levothyroxine (SYNTHROID, LEVOTHROID) 150 MCG tablet Take 137 mcg by mouth Daily.      Prenatal Vit-Fe Fumarate-FA (PRENATAL, CLASSIC, VITAMIN) 28-0.8 MG tablet tablet Take 1 tablet by mouth Daily.      sertraline (ZOLOFT) 25 MG tablet 1 tablet. HAS NOT STARTED YET         Allergies   Allergen Reactions    Latex Rash     Category: Allergy;       Family History   Problem Relation Age of Onset    Hypertension Mother     Hypertension Father     Coronary artery disease Father     Heart disease Father        Social History     Socioeconomic History    Marital status:    Tobacco Use    Smoking status: Never     Passive exposure: Never   Vaping Use    Vaping status: Never Used   Substance and Sexual Activity    Alcohol use: No    Drug use: No    Sexual activity: Yes     Partners: Male           Review of Systems   Constitutional:  Negative for chills and fever.   HENT: Negative.     Eyes:  Negative for photophobia and visual disturbance.   Respiratory:  Negative for shortness of breath.    Cardiovascular:  Negative for chest pain.   Gastrointestinal:  Negative for nausea.   Psychiatric/Behavioral:  The patient is not nervous/anxious.           PHYSICAL EXAM:      VITAL SIGNS:  There were no vitals filed for this visit.         FHT'S:    120 with medium variability                                     PHYSICAL EXAM:      General: well developed; well nourished  no acute distress  mentation appropriate   Heart: Not performed.   Lungs   breathing is unlabored   Abdomen: Gravid and non tender     Extremities: trace edema, DTRs 1 plus, no clonus       Cervix: was not checked.        Contractions:   irregular                    LABS AND TESTING ORDERED:  Uterine and fetal monitoring  Urinalysis      LAB RESULTS:    No results found for this or any previous  visit (from the past 24 hours).    Lab Results   Component Value Date    ABO O 2016    RH Positive 2016       Lab Results   Component Value Date    STREPGPB Negative 2016                 External Prenatal Results       Pregnancy Outside Results - Transcribed From Office Records - See Scanned Records For Details       Test Value Date Time    ABO  O  16 0404    Rh  Positive  16 0404    Antibody Screen       Varicella IgG       Rubella       Hgb       Hct       HgB A1c        1h GTT       3h GTT Fasting       3h GTT 1 hour       3h GTT 2 hour       3h GTT 3 hour        Gonorrhea (discrete)       Chlamydia (discrete)       RPR       Syphils cascade: TP-Ab (FTA)       TP-Ab       TP-Ab (EIA)       TPPA       HBsAg       Herpes Simplex Virus PCR       Herpes Simplex VIrus Culture       HIV       Hep C RNA Quant PCR       Hep C Antibody       AFP       NIPT       Cystic Fibrosis (Tashia)       Cystic Fibroisis        Spinal Muscular atrophy       Fragile X       Group B Strep       GBS Susceptibility to Clindamycin       GBS Susceptibility to Erythromycin       Fetal Fibronectin       Genetic Testing, Maternal Blood                 Drug Screening       Test Value Date Time    Urine Drug Screen       Amphetamine Screen       Barbiturate Screen       Benzodiazepine Screen       Methadone Screen       Phencyclidine Screen       Opiates Screen       THC Screen       Cocaine Screen       Propoxyphene Screen       Buprenorphine Screen       Methamphetamine Screen       Oxycodone Screen       Tricyclic Antidepressants Screen                 Legend    ^: Historical                              Impression:   @ 38w6d .  Final Diagnosis: Patient reports decreased fetal movement, good fetal movement here in triage, fetal testing reassuring    Plan:  1. Discharge to home.    2. Plan of care has been reviewed with patient along with risks, benefits of treatment.   All questions have been answered.  Call health care provider for any further concerns and keep office appointments.  3.  Kick counts, labor precautions, comfort measures      I discussed the patients findings and my recommendations with patient, family, and nursing staff      Brianna Olivia MD  3/16/2025  12:54 EDT

## 2025-03-17 ENCOUNTER — HOSPITAL ENCOUNTER (INPATIENT)
Facility: HOSPITAL | Age: 40
LOS: 2 days | Discharge: HOME | End: 2025-03-19
Attending: OBSTETRICS & GYNECOLOGY | Admitting: OBSTETRICS & GYNECOLOGY
Payer: COMMERCIAL

## 2025-03-17 ENCOUNTER — ANESTHESIA (OUTPATIENT)
Dept: LABOR AND DELIVERY | Facility: HOSPITAL | Age: 40
End: 2025-03-17
Payer: COMMERCIAL

## 2025-03-17 ENCOUNTER — ANESTHESIA EVENT (OUTPATIENT)
Dept: LABOR AND DELIVERY | Facility: HOSPITAL | Age: 40
End: 2025-03-17
Payer: COMMERCIAL

## 2025-03-17 PROBLEM — Z34.90 PREGNANCY: Status: ACTIVE | Noted: 2025-03-17

## 2025-03-17 LAB
ABO GROUP BLD: NORMAL
ALBUMIN SERPL-MCNC: 3.5 G/DL (ref 3.5–5.2)
ALBUMIN/GLOB SERPL: 1.1 G/DL
ALP SERPL-CCNC: 181 U/L (ref 39–117)
ALT SERPL W P-5'-P-CCNC: 14 U/L (ref 1–33)
ANION GAP SERPL CALCULATED.3IONS-SCNC: 11 MMOL/L (ref 5–15)
AST SERPL-CCNC: 20 U/L (ref 1–32)
BASOPHILS # BLD AUTO: 0.06 10*3/MM3 (ref 0–0.2)
BASOPHILS NFR BLD AUTO: 0.7 % (ref 0–1.5)
BILIRUB SERPL-MCNC: 0.3 MG/DL (ref 0–1.2)
BLD GP AB SCN SERPL QL: NEGATIVE
BUN SERPL-MCNC: 9 MG/DL (ref 6–20)
BUN/CREAT SERPL: 13.6 (ref 7–25)
CALCIUM SPEC-SCNC: 9.1 MG/DL (ref 8.6–10.5)
CHLORIDE SERPL-SCNC: 103 MMOL/L (ref 98–107)
CO2 SERPL-SCNC: 23 MMOL/L (ref 22–29)
CREAT SERPL-MCNC: 0.66 MG/DL (ref 0.57–1)
DEPRECATED RDW RBC AUTO: 44.5 FL (ref 37–54)
EGFRCR SERPLBLD CKD-EPI 2021: 113.9 ML/MIN/1.73
EOSINOPHIL # BLD AUTO: 0.03 10*3/MM3 (ref 0–0.4)
EOSINOPHIL NFR BLD AUTO: 0.3 % (ref 0.3–6.2)
ERYTHROCYTE [DISTWIDTH] IN BLOOD BY AUTOMATED COUNT: 13.1 % (ref 12.3–15.4)
GLOBULIN UR ELPH-MCNC: 3.3 GM/DL
GLUCOSE SERPL-MCNC: 96 MG/DL (ref 65–99)
HCT VFR BLD AUTO: 43 % (ref 34–46.6)
HGB BLD-MCNC: 14.1 G/DL (ref 12–15.9)
IMM GRANULOCYTES # BLD AUTO: 0.16 10*3/MM3 (ref 0–0.05)
IMM GRANULOCYTES NFR BLD AUTO: 1.7 % (ref 0–0.5)
LYMPHOCYTES # BLD AUTO: 1.69 10*3/MM3 (ref 0.7–3.1)
LYMPHOCYTES NFR BLD AUTO: 18.3 % (ref 19.6–45.3)
MCH RBC QN AUTO: 30.3 PG (ref 26.6–33)
MCHC RBC AUTO-ENTMCNC: 32.8 G/DL (ref 31.5–35.7)
MCV RBC AUTO: 92.3 FL (ref 79–97)
MONOCYTES # BLD AUTO: 0.57 10*3/MM3 (ref 0.1–0.9)
MONOCYTES NFR BLD AUTO: 6.2 % (ref 5–12)
NEUTROPHILS NFR BLD AUTO: 6.7 10*3/MM3 (ref 1.7–7)
NEUTROPHILS NFR BLD AUTO: 72.8 % (ref 42.7–76)
NRBC BLD AUTO-RTO: 0 /100 WBC (ref 0–0.2)
PLATELET # BLD AUTO: 180 10*3/MM3 (ref 140–450)
PMV BLD AUTO: 11.8 FL (ref 6–12)
POTASSIUM SERPL-SCNC: 4.4 MMOL/L (ref 3.5–5.2)
PROT SERPL-MCNC: 6.8 G/DL (ref 6–8.5)
RBC # BLD AUTO: 4.66 10*6/MM3 (ref 3.77–5.28)
RH BLD: POSITIVE
SODIUM SERPL-SCNC: 137 MMOL/L (ref 136–145)
T&S EXPIRATION DATE: NORMAL
TREPONEMA PALLIDUM IGG+IGM AB [PRESENCE] IN SERUM OR PLASMA BY IMMUNOASSAY: NORMAL
WBC NRBC COR # BLD AUTO: 9.21 10*3/MM3 (ref 3.4–10.8)

## 2025-03-17 PROCEDURE — 99202 OFFICE O/P NEW SF 15 MIN: CPT | Performed by: OBSTETRICS & GYNECOLOGY

## 2025-03-17 PROCEDURE — C1755 CATHETER, INTRASPINAL: HCPCS | Performed by: ANESTHESIOLOGY

## 2025-03-17 PROCEDURE — 80053 COMPREHEN METABOLIC PANEL: CPT | Performed by: OBSTETRICS & GYNECOLOGY

## 2025-03-17 PROCEDURE — 86780 TREPONEMA PALLIDUM: CPT | Performed by: OBSTETRICS & GYNECOLOGY

## 2025-03-17 PROCEDURE — 25810000003 LACTATED RINGERS PER 1000 ML: Performed by: OBSTETRICS & GYNECOLOGY

## 2025-03-17 PROCEDURE — 86850 RBC ANTIBODY SCREEN: CPT | Performed by: OBSTETRICS & GYNECOLOGY

## 2025-03-17 PROCEDURE — 0HQ9XZZ REPAIR PERINEUM SKIN, EXTERNAL APPROACH: ICD-10-PCS | Performed by: OBSTETRICS & GYNECOLOGY

## 2025-03-17 PROCEDURE — 86901 BLOOD TYPING SEROLOGIC RH(D): CPT | Performed by: OBSTETRICS & GYNECOLOGY

## 2025-03-17 PROCEDURE — 86900 BLOOD TYPING SEROLOGIC ABO: CPT | Performed by: OBSTETRICS & GYNECOLOGY

## 2025-03-17 PROCEDURE — 25010000002 LIDOCAINE-EPINEPHRINE (PF) 1 %-1:200000 SOLUTION: Performed by: ANESTHESIOLOGY

## 2025-03-17 PROCEDURE — 85025 COMPLETE CBC W/AUTO DIFF WBC: CPT | Performed by: OBSTETRICS & GYNECOLOGY

## 2025-03-17 RX ORDER — OXYTOCIN/0.9 % SODIUM CHLORIDE 30/500 ML
250 PLASTIC BAG, INJECTION (ML) INTRAVENOUS CONTINUOUS
Status: DISPENSED | OUTPATIENT
Start: 2025-03-17 | End: 2025-03-17

## 2025-03-17 RX ORDER — SODIUM CHLORIDE 9 MG/ML
40 INJECTION, SOLUTION INTRAVENOUS AS NEEDED
Status: DISCONTINUED | OUTPATIENT
Start: 2025-03-17 | End: 2025-03-17

## 2025-03-17 RX ORDER — EPHEDRINE SULFATE 50 MG/ML
5 INJECTION, SOLUTION INTRAVENOUS AS NEEDED
Status: DISCONTINUED | OUTPATIENT
Start: 2025-03-17 | End: 2025-03-17

## 2025-03-17 RX ORDER — FAMOTIDINE 20 MG/1
20 TABLET, FILM COATED ORAL 2 TIMES DAILY PRN
Status: DISCONTINUED | OUTPATIENT
Start: 2025-03-17 | End: 2025-03-17

## 2025-03-17 RX ORDER — METHYLERGONOVINE MALEATE 0.2 MG/ML
200 INJECTION INTRAVENOUS ONCE AS NEEDED
Status: DISCONTINUED | OUTPATIENT
Start: 2025-03-17 | End: 2025-03-19 | Stop reason: HOSPADM

## 2025-03-17 RX ORDER — ONDANSETRON 4 MG/1
4 TABLET, ORALLY DISINTEGRATING ORAL EVERY 8 HOURS PRN
Status: DISCONTINUED | OUTPATIENT
Start: 2025-03-17 | End: 2025-03-19 | Stop reason: HOSPADM

## 2025-03-17 RX ORDER — ONDANSETRON 2 MG/ML
4 INJECTION INTRAMUSCULAR; INTRAVENOUS ONCE AS NEEDED
Status: DISCONTINUED | OUTPATIENT
Start: 2025-03-17 | End: 2025-03-17

## 2025-03-17 RX ORDER — ERYTHROMYCIN 5 MG/G
OINTMENT OPHTHALMIC
Status: ACTIVE
Start: 2025-03-17 | End: 2025-03-18

## 2025-03-17 RX ORDER — ONDANSETRON 2 MG/ML
4 INJECTION INTRAMUSCULAR; INTRAVENOUS EVERY 6 HOURS PRN
Status: DISCONTINUED | OUTPATIENT
Start: 2025-03-17 | End: 2025-03-17

## 2025-03-17 RX ORDER — METHYLERGONOVINE MALEATE 0.2 MG/ML
200 INJECTION INTRAVENOUS ONCE AS NEEDED
Status: DISCONTINUED | OUTPATIENT
Start: 2025-03-17 | End: 2025-03-17

## 2025-03-17 RX ORDER — SODIUM CHLORIDE, SODIUM LACTATE, POTASSIUM CHLORIDE, CALCIUM CHLORIDE 600; 310; 30; 20 MG/100ML; MG/100ML; MG/100ML; MG/100ML
125 INJECTION, SOLUTION INTRAVENOUS CONTINUOUS
Status: DISCONTINUED | OUTPATIENT
Start: 2025-03-17 | End: 2025-03-17

## 2025-03-17 RX ORDER — FENTANYL/ROPIVACAINE/NS/PF 2MCG/ML-.2
10 PLASTIC BAG, INJECTION (ML) INJECTION CONTINUOUS
Refills: 0 | Status: DISCONTINUED | OUTPATIENT
Start: 2025-03-17 | End: 2025-03-17

## 2025-03-17 RX ORDER — DIPHENHYDRAMINE HYDROCHLORIDE 50 MG/ML
12.5 INJECTION, SOLUTION INTRAMUSCULAR; INTRAVENOUS EVERY 8 HOURS PRN
Status: DISCONTINUED | OUTPATIENT
Start: 2025-03-17 | End: 2025-03-17

## 2025-03-17 RX ORDER — SODIUM CHLORIDE 0.9 % (FLUSH) 0.9 %
10 SYRINGE (ML) INJECTION AS NEEDED
Status: DISCONTINUED | OUTPATIENT
Start: 2025-03-17 | End: 2025-03-17

## 2025-03-17 RX ORDER — TRAMADOL HYDROCHLORIDE 50 MG/1
50 TABLET ORAL EVERY 6 HOURS PRN
Status: DISCONTINUED | OUTPATIENT
Start: 2025-03-17 | End: 2025-03-19 | Stop reason: HOSPADM

## 2025-03-17 RX ORDER — OXYTOCIN/0.9 % SODIUM CHLORIDE 30/500 ML
125 PLASTIC BAG, INJECTION (ML) INTRAVENOUS ONCE AS NEEDED
Status: COMPLETED | OUTPATIENT
Start: 2025-03-17 | End: 2025-03-17

## 2025-03-17 RX ORDER — LIDOCAINE HYDROCHLORIDE 10 MG/ML
0.5 INJECTION, SOLUTION INFILTRATION; PERINEURAL ONCE AS NEEDED
Status: DISCONTINUED | OUTPATIENT
Start: 2025-03-17 | End: 2025-03-17

## 2025-03-17 RX ORDER — HYDROCORTISONE 25 MG/G
CREAM TOPICAL AS NEEDED
Status: DISCONTINUED | OUTPATIENT
Start: 2025-03-17 | End: 2025-03-19 | Stop reason: HOSPADM

## 2025-03-17 RX ORDER — PROMETHAZINE HYDROCHLORIDE 12.5 MG/1
12.5 SUPPOSITORY RECTAL EVERY 6 HOURS PRN
Status: DISCONTINUED | OUTPATIENT
Start: 2025-03-17 | End: 2025-03-19 | Stop reason: HOSPADM

## 2025-03-17 RX ORDER — FAMOTIDINE 10 MG/ML
20 INJECTION, SOLUTION INTRAVENOUS ONCE AS NEEDED
Status: DISCONTINUED | OUTPATIENT
Start: 2025-03-17 | End: 2025-03-17

## 2025-03-17 RX ORDER — TRANEXAMIC ACID 10 MG/ML
1000 INJECTION, SOLUTION INTRAVENOUS ONCE AS NEEDED
Status: DISCONTINUED | OUTPATIENT
Start: 2025-03-17 | End: 2025-03-17

## 2025-03-17 RX ORDER — PRENATAL VIT/IRON FUM/FOLIC AC 27MG-0.8MG
1 TABLET ORAL DAILY
Status: DISCONTINUED | OUTPATIENT
Start: 2025-03-18 | End: 2025-03-19 | Stop reason: HOSPADM

## 2025-03-17 RX ORDER — TRANEXAMIC ACID 10 MG/ML
1000 INJECTION, SOLUTION INTRAVENOUS ONCE AS NEEDED
Status: DISCONTINUED | OUTPATIENT
Start: 2025-03-17 | End: 2025-03-19 | Stop reason: HOSPADM

## 2025-03-17 RX ORDER — BUTORPHANOL TARTRATE 2 MG/ML
2 INJECTION, SOLUTION INTRAMUSCULAR; INTRAVENOUS
Status: DISCONTINUED | OUTPATIENT
Start: 2025-03-17 | End: 2025-03-17

## 2025-03-17 RX ORDER — OXYTOCIN/0.9 % SODIUM CHLORIDE 30/500 ML
999 PLASTIC BAG, INJECTION (ML) INTRAVENOUS ONCE
Status: COMPLETED | OUTPATIENT
Start: 2025-03-17 | End: 2025-03-17

## 2025-03-17 RX ORDER — SODIUM CHLORIDE 0.9 % (FLUSH) 0.9 %
10 SYRINGE (ML) INJECTION EVERY 12 HOURS SCHEDULED
Status: DISCONTINUED | OUTPATIENT
Start: 2025-03-17 | End: 2025-03-17

## 2025-03-17 RX ORDER — BISACODYL 10 MG
10 SUPPOSITORY, RECTAL RECTAL DAILY PRN
Status: DISCONTINUED | OUTPATIENT
Start: 2025-03-18 | End: 2025-03-19 | Stop reason: HOSPADM

## 2025-03-17 RX ORDER — HYDROXYZINE HYDROCHLORIDE 50 MG/1
50 TABLET, FILM COATED ORAL NIGHTLY PRN
Status: DISCONTINUED | OUTPATIENT
Start: 2025-03-17 | End: 2025-03-19 | Stop reason: HOSPADM

## 2025-03-17 RX ORDER — FAMOTIDINE 10 MG/ML
20 INJECTION, SOLUTION INTRAVENOUS 2 TIMES DAILY PRN
Status: DISCONTINUED | OUTPATIENT
Start: 2025-03-17 | End: 2025-03-17

## 2025-03-17 RX ORDER — LEVOTHYROXINE SODIUM 137 UG/1
137 TABLET ORAL
Status: DISCONTINUED | OUTPATIENT
Start: 2025-03-18 | End: 2025-03-19 | Stop reason: HOSPADM

## 2025-03-17 RX ORDER — IBUPROFEN 600 MG/1
600 TABLET, FILM COATED ORAL EVERY 6 HOURS PRN
Status: DISCONTINUED | OUTPATIENT
Start: 2025-03-17 | End: 2025-03-19 | Stop reason: HOSPADM

## 2025-03-17 RX ORDER — ACETAMINOPHEN 325 MG/1
650 TABLET ORAL EVERY 6 HOURS PRN
Status: DISCONTINUED | OUTPATIENT
Start: 2025-03-17 | End: 2025-03-19 | Stop reason: HOSPADM

## 2025-03-17 RX ORDER — TERBUTALINE SULFATE 1 MG/ML
0.25 INJECTION SUBCUTANEOUS AS NEEDED
Status: DISCONTINUED | OUTPATIENT
Start: 2025-03-17 | End: 2025-03-17

## 2025-03-17 RX ORDER — MISOPROSTOL 200 UG/1
600 TABLET ORAL ONCE AS NEEDED
Status: DISCONTINUED | OUTPATIENT
Start: 2025-03-17 | End: 2025-03-19 | Stop reason: HOSPADM

## 2025-03-17 RX ORDER — OXYTOCIN/0.9 % SODIUM CHLORIDE 30/500 ML
125 PLASTIC BAG, INJECTION (ML) INTRAVENOUS CONTINUOUS PRN
Status: DISCONTINUED | OUTPATIENT
Start: 2025-03-17 | End: 2025-03-19 | Stop reason: HOSPADM

## 2025-03-17 RX ORDER — MISOPROSTOL 200 UG/1
800 TABLET ORAL ONCE AS NEEDED
Status: DISCONTINUED | OUTPATIENT
Start: 2025-03-17 | End: 2025-03-17

## 2025-03-17 RX ORDER — CARBOPROST TROMETHAMINE 250 UG/ML
250 INJECTION, SOLUTION INTRAMUSCULAR
Status: DISCONTINUED | OUTPATIENT
Start: 2025-03-17 | End: 2025-03-17

## 2025-03-17 RX ORDER — MAGNESIUM CARB/ALUMINUM HYDROX 105-160MG
30 TABLET,CHEWABLE ORAL ONCE AS NEEDED
Status: DISCONTINUED | OUTPATIENT
Start: 2025-03-17 | End: 2025-03-17

## 2025-03-17 RX ORDER — ACETAMINOPHEN 325 MG/1
650 TABLET ORAL EVERY 4 HOURS PRN
Status: DISCONTINUED | OUTPATIENT
Start: 2025-03-17 | End: 2025-03-17

## 2025-03-17 RX ORDER — ONDANSETRON 2 MG/ML
4 INJECTION INTRAMUSCULAR; INTRAVENOUS EVERY 6 HOURS PRN
Status: DISCONTINUED | OUTPATIENT
Start: 2025-03-17 | End: 2025-03-19 | Stop reason: HOSPADM

## 2025-03-17 RX ORDER — ONDANSETRON 4 MG/1
4 TABLET, ORALLY DISINTEGRATING ORAL EVERY 6 HOURS PRN
Status: DISCONTINUED | OUTPATIENT
Start: 2025-03-17 | End: 2025-03-17

## 2025-03-17 RX ORDER — KETOROLAC TROMETHAMINE 15 MG/ML
15 INJECTION, SOLUTION INTRAMUSCULAR; INTRAVENOUS EVERY 6 HOURS PRN
Status: ACTIVE | OUTPATIENT
Start: 2025-03-17 | End: 2025-03-18

## 2025-03-17 RX ORDER — PHYTONADIONE 1 MG/.5ML
INJECTION, EMULSION INTRAMUSCULAR; INTRAVENOUS; SUBCUTANEOUS
Status: ACTIVE
Start: 2025-03-17 | End: 2025-03-18

## 2025-03-17 RX ORDER — DOCUSATE SODIUM 100 MG/1
100 CAPSULE, LIQUID FILLED ORAL 2 TIMES DAILY
Status: DISCONTINUED | OUTPATIENT
Start: 2025-03-17 | End: 2025-03-19 | Stop reason: HOSPADM

## 2025-03-17 RX ORDER — CALCIUM CARBONATE 500 MG/1
2 TABLET, CHEWABLE ORAL 3 TIMES DAILY PRN
Status: DISCONTINUED | OUTPATIENT
Start: 2025-03-17 | End: 2025-03-19 | Stop reason: HOSPADM

## 2025-03-17 RX ORDER — PROMETHAZINE HYDROCHLORIDE 25 MG/1
25 TABLET ORAL EVERY 6 HOURS PRN
Status: DISCONTINUED | OUTPATIENT
Start: 2025-03-17 | End: 2025-03-19 | Stop reason: HOSPADM

## 2025-03-17 RX ADMIN — Medication 10 ML/HR: at 13:43

## 2025-03-17 RX ADMIN — IBUPROFEN 600 MG: 600 TABLET ORAL at 18:19

## 2025-03-17 RX ADMIN — EPHEDRINE SULFATE 5 MG: 50 INJECTION INTRAVENOUS at 14:20

## 2025-03-17 RX ADMIN — SODIUM CHLORIDE, SODIUM LACTATE, POTASSIUM CHLORIDE, CALCIUM CHLORIDE 999 ML/HR: 20; 30; 600; 310 INJECTION, SOLUTION INTRAVENOUS at 13:33

## 2025-03-17 RX ADMIN — LIDOCAINE HYDROCHLORIDE 5 MG: 10; .005 INJECTION, SOLUTION EPIDURAL; INFILTRATION; INTRACAUDAL; PERINEURAL at 13:40

## 2025-03-17 RX ADMIN — ACETAMINOPHEN 650 MG: 325 TABLET, FILM COATED ORAL at 22:49

## 2025-03-17 RX ADMIN — Medication 999 ML/HR: at 15:21

## 2025-03-17 RX ADMIN — Medication 125 ML/HR: at 16:37

## 2025-03-17 RX ADMIN — EPHEDRINE SULFATE 5 MG: 50 INJECTION INTRAVENOUS at 14:49

## 2025-03-17 RX ADMIN — SODIUM CHLORIDE, SODIUM LACTATE, POTASSIUM CHLORIDE, CALCIUM CHLORIDE 125 ML/HR: 20; 30; 600; 310 INJECTION, SOLUTION INTRAVENOUS at 14:53

## 2025-03-17 RX ADMIN — LIDOCAINE HYDROCHLORIDE 4 MG: 10; .005 INJECTION, SOLUTION EPIDURAL; INFILTRATION; INTRACAUDAL; PERINEURAL at 13:37

## 2025-03-17 RX ADMIN — SODIUM CHLORIDE, SODIUM LACTATE, POTASSIUM CHLORIDE, CALCIUM CHLORIDE 125 ML/HR: 20; 30; 600; 310 INJECTION, SOLUTION INTRAVENOUS at 12:40

## 2025-03-17 RX ADMIN — DOCUSATE SODIUM 100 MG: 100 CAPSULE, LIQUID FILLED ORAL at 22:49

## 2025-03-17 NOTE — PLAN OF CARE
Problem: Adult Inpatient Plan of Care  Goal: Plan of Care Review  Outcome: Progressing  Flowsheets (Taken 3/17/2025 1655)  Progress: improving  Outcome Evaluation: Pt admitted for SROM. Pt got an epidural for pain management and had a spontaneous vaginal delivery. QBL 97 with plans to transfer to M/B at end of recovery.  Plan of Care Reviewed With:   patient   spouse  Goal: Patient-Specific Goal (Individualized)  Outcome: Progressing  Goal: Absence of Hospital-Acquired Illness or Injury  Outcome: Progressing  Intervention: Identify and Manage Fall Risk  Recent Flowsheet Documentation  Taken 3/17/2025 1300 by Kimberli Vaughan RN  Safety Promotion/Fall Prevention: safety round/check completed  Goal: Optimal Comfort and Wellbeing  Outcome: Progressing  Intervention: Provide Person-Centered Care  Recent Flowsheet Documentation  Taken 3/17/2025 1350 by Kimberli Vaughan RN  Trust Relationship/Rapport:   care explained   choices provided   emotional support provided   empathic listening provided  Taken 3/17/2025 1300 by Kimberli Vaughan RN  Trust Relationship/Rapport:   care explained   choices provided   emotional support provided   questions answered   questions encouraged   reassurance provided   thoughts/feelings acknowledged  Goal: Readiness for Transition of Care  Outcome: Progressing     Problem: Labor  Goal: Hemostasis  Outcome: Progressing  Goal: Stable Fetal Wellbeing  Outcome: Progressing     Problem: Skin Injury Risk Increased  Goal: Skin Health and Integrity  Outcome: Progressing   Goal Outcome Evaluation:  Plan of Care Reviewed With: patient, spouse        Progress: improving  Outcome Evaluation: Pt admitted for SROM. Pt got an epidural for pain management and had a spontaneous vaginal delivery. QBL 97 with plans to transfer to /B at end of recovery.

## 2025-03-17 NOTE — ANESTHESIA PROCEDURE NOTES
Labor Epidural      Patient reassessed immediately prior to procedure    Patient location during procedure: OB  Indication:at surgeon's request  Performed By  Anesthesiologist: Brian Marinelli MD  Preanesthetic Checklist  Completed: patient identified, IV checked, site marked, risks and benefits discussed, surgical consent, monitors and equipment checked, pre-op evaluation and timeout performed  Additional Notes  Connected epidural catheter to PCEA and detailed instructions given to patient for usage of PCEA pump.  Prep:  Pt Position:sitting  Sterile Tech:cap, gloves, mask and sterile barrier  Prep:chlorhexidine gluconate and isopropyl alcohol  Monitoring:blood pressure monitoring and EKG  Epidural Block Procedure:  Approach:midline  Guidance:landmark technique and palpation technique  Location:L3-L4  Needle Type:Tuohy  Needle Gauge:17 G  Loss of Resistance Medium: saline  Loss of Resistance: 4cm  Cath Depth at skin:10 cm  Paresthesia: none  Aspiration:negative  Test Dose:negative  Number of Attempts: 1  Post Assessment:  Dressing:occlusive dressing applied and secured with tape  Pt Tolerance:patient tolerated the procedure well with no apparent complications  Complications:no

## 2025-03-17 NOTE — L&D DELIVERY NOTE
[facility]  Vaginal Delivery Note    Patient Name: Patrick Leslie  :  1985  MRN:  [id number]          Pregnancy      Labor status: Active spontaneous labor       Delivery     Delivery:      YOB: 2026   Time of Birth: 3:17 PM     Anesthesia:      Delivering clinician: Patti Simons MD       Infant    Findings: Viable female infant    Infant observations: Weight: No birth weight on file.  Observations/Comments:  ldr 12 panda     Apgars:   @ 1 minute /      @ 5 minutes     Placenta, Cord, and Fluid    Placenta delivered     at       Cord:   present.   Cord blood obtained:     Cord gases obtained:        Repair    Episiotomy: No   Lacerations: First-degree perineal         Estimated Blood Loss:  Quantitative Blood Loss:    mls.  Quantitative Blood Loss (mL): 97 mL         Delivery narrative: The patient is a 39 y.o.  at 39w0d.  Presented with ruptured membranes and active labor.  She received an epidural.  Progressed normally to C/C/+1 . Fetal status reassuring throughout.  of viable female infant  @ 3:17 PM over an intact perineum. ASDE. No birth weight on file..  Placenta delivered spontaneously, intact with 3 vessel cord. Cervix and rectum intact.  First degree laceration repaired in usual fashion with 3-0 Vicryl suture. Mother and baby recovering good condition.       Patti Simons MD  25  15:47 EDT

## 2025-03-17 NOTE — ANESTHESIA PREPROCEDURE EVALUATION
Anesthesia Evaluation     no history of anesthetic complications:                Airway   Neck ROM: full  no difficulty expected  Dental - normal exam     Pulmonary - negative pulmonary ROS and normal exam   (-) COPD, asthma, sleep apnea, not a smoker    PE comment: nonlabored  Cardiovascular - negative cardio ROS and normal exam  Exercise tolerance: good (4-7 METS)    Rhythm: regular  Rate: normal    (-) hypertension, valvular problems/murmurs, past MI, CAD, dysrhythmias, angina      Neuro/Psych  (+) headaches  (-) seizures, TIA, CVA  GI/Hepatic/Renal/Endo    (+) thyroid problem (s/p thyroidectomy) thyroid cancer  (-) GERD, liver disease, no renal disease, diabetes    Musculoskeletal (-) negative ROS    Abdominal    Substance History      OB/GYN    (+) Pregnant (@39wks 0days)    Comment: Polycystic ovary syndrome      Other      history of cancer (h/o thyroid cancer)                Anesthesia Plan    ASA 2     epidural       Anesthetic plan, risks, benefits, and alternatives have been provided, discussed and informed consent has been obtained with: patient.    CODE STATUS:    Code Status (Patient has no pulse and is not breathing): CPR (Attempt to Resuscitate)  Medical Interventions (Patient has pulse or is breathing): Full Support  Level Of Support Discussed With: Patient

## 2025-03-17 NOTE — ANESTHESIA POSTPROCEDURE EVALUATION
Patient: Patrick Leslie    Procedure Summary       Date: 03/17/25 Room / Location:     Anesthesia Start: 1332 Anesthesia Stop: 1517    Procedure: LABOR ANALGESIA Diagnosis:     Scheduled Providers:  Provider: Brian Marinelli MD    Anesthesia Type: epidural ASA Status: 2            Anesthesia Type: epidural    Vitals  Vitals Value Taken Time   /68 03/17/25 16:01   Temp 36.8 °C (98.2 °F) 03/17/25 15:30   Pulse 81 03/17/25 16:05   Resp 12 03/17/25 16:00   SpO2 100 % 03/17/25 16:05           Post Anesthesia Care and Evaluation      Comments: I or one of my partners was immediately available during labor and the post-partum period.

## 2025-03-18 ENCOUNTER — HOSPITAL ENCOUNTER (OUTPATIENT)
Dept: LABOR AND DELIVERY | Facility: HOSPITAL | Age: 40
Discharge: HOME | End: 2025-03-18

## 2025-03-18 LAB
BASOPHILS # BLD AUTO: 0.05 10*3/MM3 (ref 0–0.2)
BASOPHILS NFR BLD AUTO: 0.4 % (ref 0–1.5)
DEPRECATED RDW RBC AUTO: 44.3 FL (ref 37–54)
EOSINOPHIL # BLD AUTO: 0.07 10*3/MM3 (ref 0–0.4)
EOSINOPHIL NFR BLD AUTO: 0.6 % (ref 0.3–6.2)
ERYTHROCYTE [DISTWIDTH] IN BLOOD BY AUTOMATED COUNT: 13.1 % (ref 12.3–15.4)
HCT VFR BLD AUTO: 35.9 % (ref 34–46.6)
HGB BLD-MCNC: 12.3 G/DL (ref 12–15.9)
IMM GRANULOCYTES # BLD AUTO: 0.16 10*3/MM3 (ref 0–0.05)
IMM GRANULOCYTES NFR BLD AUTO: 1.3 % (ref 0–0.5)
LYMPHOCYTES # BLD AUTO: 1.73 10*3/MM3 (ref 0.7–3.1)
LYMPHOCYTES NFR BLD AUTO: 14.5 % (ref 19.6–45.3)
MCH RBC QN AUTO: 31.5 PG (ref 26.6–33)
MCHC RBC AUTO-ENTMCNC: 34.3 G/DL (ref 31.5–35.7)
MCV RBC AUTO: 91.8 FL (ref 79–97)
MONOCYTES # BLD AUTO: 0.61 10*3/MM3 (ref 0.1–0.9)
MONOCYTES NFR BLD AUTO: 5.1 % (ref 5–12)
NEUTROPHILS NFR BLD AUTO: 78.1 % (ref 42.7–76)
NEUTROPHILS NFR BLD AUTO: 9.31 10*3/MM3 (ref 1.7–7)
NRBC BLD AUTO-RTO: 0 /100 WBC (ref 0–0.2)
PLATELET # BLD AUTO: 154 10*3/MM3 (ref 140–450)
PMV BLD AUTO: 11.8 FL (ref 6–12)
RBC # BLD AUTO: 3.91 10*6/MM3 (ref 3.77–5.28)
WBC NRBC COR # BLD AUTO: 11.93 10*3/MM3 (ref 3.4–10.8)

## 2025-03-18 PROCEDURE — 85025 COMPLETE CBC W/AUTO DIFF WBC: CPT | Performed by: OBSTETRICS & GYNECOLOGY

## 2025-03-18 RX ADMIN — PRENATAL VITAMINS-IRON FUMARATE 27 MG IRON-FOLIC ACID 0.8 MG TABLET 1 TABLET: at 07:58

## 2025-03-18 RX ADMIN — IBUPROFEN 600 MG: 600 TABLET ORAL at 20:16

## 2025-03-18 RX ADMIN — ACETAMINOPHEN 650 MG: 325 TABLET, FILM COATED ORAL at 18:00

## 2025-03-18 RX ADMIN — IBUPROFEN 600 MG: 600 TABLET ORAL at 07:58

## 2025-03-18 RX ADMIN — Medication: at 11:39

## 2025-03-18 RX ADMIN — DOCUSATE SODIUM 100 MG: 100 CAPSULE, LIQUID FILLED ORAL at 07:58

## 2025-03-18 RX ADMIN — DOCUSATE SODIUM 100 MG: 100 CAPSULE, LIQUID FILLED ORAL at 20:01

## 2025-03-18 RX ADMIN — ACETAMINOPHEN 650 MG: 325 TABLET, FILM COATED ORAL at 04:31

## 2025-03-18 RX ADMIN — IBUPROFEN 600 MG: 600 TABLET ORAL at 00:52

## 2025-03-18 RX ADMIN — IBUPROFEN 600 MG: 600 TABLET ORAL at 14:08

## 2025-03-18 RX ADMIN — ACETAMINOPHEN 650 MG: 325 TABLET, FILM COATED ORAL at 11:38

## 2025-03-18 RX ADMIN — LEVOTHYROXINE SODIUM 137 MCG: 137 TABLET ORAL at 06:31

## 2025-03-18 NOTE — LACTATION NOTE
P3,T  mother bf other children (now 8 and 10 y/o) for over one year. She did triple feed with first and did not have issues with second.   Baby has been rewarmed once, glucose level 48. Baby is alert and mom will try bf now so latch can be assessed. Placed baby in a FB hold and demonstrated hand expression. She was independent with latching and mostly verbal guidance given. Baby latched easily with a wide gape and good jaw movement. Mom noted strong tugging without pain. Encouraged feeding regularly now when alert before baby became sleepy.         Reviewed bf education: ways to wake baby- STS, suck training, stimulation, HE colostrum, burping.   Attempt feeding on demand but at least every 3 hours and when baby is alert, feeding cues present.   Availability of donor milk  Normal  behavior including sleepiness  Proper way to position and steps for an effective latch, break latch if pinching and check nipple shape after feeds.   Weight and output expectations.  Infant stomach size  Full milk supply day 3-5.  Nipple care. Lanolin given with instructions.  Review Postpartum handbook pages 35-45, [facility] information.  Call LC for any assistance. # on WB.   Has PBP.

## 2025-03-18 NOTE — PROGRESS NOTES
[facility]  Vaginal Delivery Progress Note    Patient Name: Patrick Leslie  :  1985  MRN:  [id number]      Post-Partum Day 1 S/P   Subjective     Patient reports:   The patient feels well without complaints.  Pain is controlled on PO meds. Tolerating regular diet and having flatus. Voiding and ambulating without difficulty. Normal lochia. Reports breastfeeding is going well.       Objective     Vital Signs Range for the last 24 hours  Temperature: Temp:  [98.2 °F (36.8 °C)-99.5 °F (37.5 °C)] 98.2 °F (36.8 °C)   BP: BP: ()/() 111/73   Pulse: Heart Rate:  [] 69   Respirations: Resp:  [12-18] 16     Physical Exam:  General:  Awake and alert, well-appearing, no acute distress  CV:   Well perfused, No peripheral edema  Pulm:  No increased work of breathing or signs of respiratory distress.  Abdomen: Soft, non-distended, no rebound or guarding  Fundus: Firm, @ umbilicus, nontender  Neuro:  Moves all extremities equally  Psych:  Mood and affect appropriate    Labs:   Results from last 7 days   Lab Units 25  0836 25  1236   WBC 10*3/mm3 11.93* 9.21   HEMOGLOBIN g/dL 12.3 14.1   HEMATOCRIT % 35.9 43.0   PLATELETS 10*3/mm3 154 180       Prenatal labs results reviewed:  Yes   Rubella:   immune  Rh Status:  POS  RH type   Date Value Ref Range Status   2025 Positive  Final         Assessment & Plan  :     PPD1 S/P   Doing well, continue plan of care     Postpartum Blood Loss  Delivery QBL 97  PP Hgb 12.3- hemodynamically stable    Postpartum Planning  Breast feeding: going well, no concerns  Female infant; doing well at pt's bedside  Rubella Immune  Maternal Blood Type: O Pos       Lorene De La Torre, SHU  3/18/2025  10:11 EDT

## 2025-03-18 NOTE — PLAN OF CARE
Problem: Adult Inpatient Plan of Care  Goal: Plan of Care Review  Outcome: Progressing  Flowsheets (Taken 3/18/2025 1404)  Progress: improving  Outcome Evaluation: Postpartum day 1. VSS. Pain controlled. Up ad carlyn and voiding spontaneously.  Plan of Care Reviewed With:   patient   spouse

## 2025-03-18 NOTE — LACTATION NOTE
This note was copied from a baby's chart.  Mom reports baby is latching well.  Has some nipple soreness with initial latch but improves during feeding.  Lanolin given and educated on nipple care.  Encouraged to call for assist as needed today.  LC number on whiteboard.

## 2025-03-18 NOTE — PLAN OF CARE
Goal Outcome Evaluation:  Plan of Care Reviewed With: patient        Progress: improving  Outcome Evaluation: VSS. Pt up ad carlyn and voiding without difficulty.  Fundal assessment and lochia, wnl.

## 2025-03-18 NOTE — H&P
39-year-old  3 para 2 at 39 weeks gestation who was admitted with spontaneous rupture membranes and contractions.  Prenatal course has been uncomplicated.  H&P reviewed. The patient was examined and there are no changes to the H&P.    Patti Simons MD  3/17/2025  22:02 EDT

## 2025-03-19 VITALS
WEIGHT: 162.4 LBS | RESPIRATION RATE: 16 BRPM | OXYGEN SATURATION: 100 % | HEART RATE: 69 BPM | SYSTOLIC BLOOD PRESSURE: 124 MMHG | BODY MASS INDEX: 28.77 KG/M2 | TEMPERATURE: 98 F | HEIGHT: 63 IN | DIASTOLIC BLOOD PRESSURE: 72 MMHG

## 2025-03-19 PROBLEM — Z34.90 PREGNANCY: Status: RESOLVED | Noted: 2025-03-17 | Resolved: 2025-03-19

## 2025-03-19 RX ORDER — PSEUDOEPHEDRINE HCL 30 MG
100 TABLET ORAL 2 TIMES DAILY
Qty: 60 CAPSULE | Refills: 0 | Status: SHIPPED | OUTPATIENT
Start: 2025-03-19

## 2025-03-19 RX ORDER — IBUPROFEN 600 MG/1
600 TABLET, FILM COATED ORAL EVERY 6 HOURS PRN
Qty: 40 TABLET | Refills: 0 | Status: SHIPPED | OUTPATIENT
Start: 2025-03-19

## 2025-03-19 RX ADMIN — LEVOTHYROXINE SODIUM 137 MCG: 137 TABLET ORAL at 06:30

## 2025-03-19 RX ADMIN — IBUPROFEN 600 MG: 600 TABLET ORAL at 02:34

## 2025-03-19 RX ADMIN — PRENATAL VITAMINS-IRON FUMARATE 27 MG IRON-FOLIC ACID 0.8 MG TABLET 1 TABLET: at 09:19

## 2025-03-19 RX ADMIN — DOCUSATE SODIUM 100 MG: 100 CAPSULE, LIQUID FILLED ORAL at 09:19

## 2025-03-19 RX ADMIN — IBUPROFEN 600 MG: 600 TABLET ORAL at 09:19

## 2025-03-19 NOTE — PLAN OF CARE
Problem: Adult Inpatient Plan of Care  Goal: Plan of Care Review  Outcome: Progressing  Flowsheets (Taken 3/19/2025 0540)  Outcome Evaluation: VSS, fundus and lochia WDL, pain controlled with up ad carlyn and voiding, mom and baby bonding well.  Goal: Patient-Specific Goal (Individualized)  Outcome: Progressing  Goal: Absence of Hospital-Acquired Illness or Injury  Outcome: Progressing  Intervention: Identify and Manage Fall Risk  Recent Flowsheet Documentation  Taken 3/19/2025 0419 by Eli Nj RN  Safety Promotion/Fall Prevention: safety round/check completed  Taken 3/19/2025 0234 by Eli Nj RN  Safety Promotion/Fall Prevention: safety round/check completed  Taken 3/19/2025 0030 by Eli Nj RN  Safety Promotion/Fall Prevention: safety round/check completed  Taken 3/18/2025 2237 by Eli Nj RN  Safety Promotion/Fall Prevention: safety round/check completed  Taken 3/18/2025 1958 by Eli Nj RN  Safety Promotion/Fall Prevention: safety round/check completed  Intervention: Prevent Skin Injury  Recent Flowsheet Documentation  Taken 3/18/2025 1958 by Eli Nj RN  Body Position: sitting up in bed  Goal: Optimal Comfort and Wellbeing  Outcome: Progressing  Intervention: Monitor Pain and Promote Comfort  Recent Flowsheet Documentation  Taken 3/19/2025 0234 by Eli Nj RN  Pain Management Interventions: pain medication given  Taken 3/18/2025 2016 by Eli Nj RN  Pain Management Interventions: pain medication given  Intervention: Provide Person-Centered Care  Recent Flowsheet Documentation  Taken 3/18/2025 1958 by Eli Nj RN  Trust Relationship/Rapport:   care explained   choices provided   questions answered   questions encouraged  Goal: Readiness for Transition of Care  Outcome: Progressing     Problem: Labor  Goal: Hemostasis  Outcome: Progressing  Goal: Stable Fetal Wellbeing  Outcome: Progressing  Intervention: Promote and Monitor Fetal Wellbeing  Recent  Flowsheet Documentation  Taken 3/18/2025 1958 by Eli Nj RN  Body Position: sitting up in bed     Problem: Skin Injury Risk Increased  Goal: Skin Health and Integrity  Outcome: Progressing  Intervention: Optimize Skin Protection  Recent Flowsheet Documentation  Taken 3/18/2025 1958 by Eli Nj RN  Activity Management: up ad carlyn     Problem: Postpartum (Vaginal Delivery)  Goal: Successful Parent Role Transition  Outcome: Progressing  Goal: Hemostasis  Outcome: Progressing  Goal: Absence of Infection Signs and Symptoms  Outcome: Progressing  Goal: Anesthesia/Sedation Recovery  Outcome: Progressing  Intervention: Optimize Anesthesia Recovery  Recent Flowsheet Documentation  Taken 3/19/2025 0419 by Eli Nj RN  Safety Promotion/Fall Prevention: safety round/check completed  Taken 3/19/2025 0234 by Eli Nj RN  Safety Promotion/Fall Prevention: safety round/check completed  Taken 3/19/2025 0030 by Eli Nj RN  Safety Promotion/Fall Prevention: safety round/check completed  Taken 3/18/2025 2237 by Eli Nj RN  Safety Promotion/Fall Prevention: safety round/check completed  Taken 3/18/2025 1958 by Eli Nj RN  Safety Promotion/Fall Prevention: safety round/check completed  Goal: Optimal Pain Control and Function  Outcome: Progressing  Intervention: Prevent or Manage Pain  Recent Flowsheet Documentation  Taken 3/19/2025 0234 by Eli Nj RN  Pain Management Interventions: pain medication given  Taken 3/18/2025 2016 by Eli Nj RN  Pain Management Interventions: pain medication given  Goal: Effective Urinary Elimination  Outcome: Progressing   Goal Outcome Evaluation:              Outcome Evaluation: VSS, fundus and lochia WDL, pain controlled with up ad carlyn and voiding, mom and baby bonding well.

## 2025-03-19 NOTE — DISCHARGE SUMMARY
Date of Discharge:  3/19/2025    Discharge Diagnosis: Pregnancy s/p vaginal delivery    Presenting Problem/History of Present Illness  Pregnancy [Z34.90]  Pregnancy [Z34.90]       Hospital Course  Patient is a 39 y.o. female presented with SROM at term. On 3/17/2025,  by Dr. Simons of female infant weighing 7lb 0.2oz. Her post-partum course was uncomplicated and on PPD 2 she was meeting all criteria for discharge including adequate pain control, minimal lochia, and ability to ambulate, void and tolerate PO intake without difficulty. She was discharged home with standard discharge precautions and instructions.       Procedures Performed     Vaginal Delivery    Consults:   Consults       No orders found from 2025 to 3/18/2025.            Condition on Discharge:   Subjective   Postpartum Day 2 Vaginal Delivery.    The patient feels well without complaints.    Vital Signs  Temp:  [98 °F (36.7 °C)-98.2 °F (36.8 °C)] 98 °F (36.7 °C)  Heart Rate:  [61-70] 70  Resp:  [16-18] 18  BP: (112)/(65-69) 112/65    Physical Exam:   General: Awake and alert   Abdomen: Fundus: firm, non tender    Extremities:  Calves NT bilaterally    Assessment & Plan     PPD2  S/P : Stable for discharge. Instructions reviewed  Hypothyroid: followed by endocrinology--to resume 112mcg daily--has at home and has follow-up with endo scheduled in 1 month      Discharge Disposition  Home or Self Care    Discharge Medications     Discharge Medications        New Medications        Instructions Start Date   docusate sodium 100 MG capsule   100 mg, Oral, 2 Times Daily      ibuprofen 600 MG tablet  Commonly known as: ADVIL,MOTRIN   600 mg, Oral, Every 6 Hours PRN             Continue These Medications        Instructions Start Date   prenatal (CLASSIC) vitamin 28-0.8 MG tablet tablet  Generic drug: prenatal vitamin   1 tablet, Daily             Stop These Medications      levothyroxine 150 MCG tablet  Commonly known as: SYNTHROID, LEVOTHROID                 Activity at Discharge: restrictions reviewed    Follow-up Appointments    Telehealth: 2 weeks  Postpartum Exam: 6 weeks      Test Results Pending at Discharge       SHU Tuttle  03/19/25  09:48 EDT

## 2025-03-19 NOTE — LACTATION NOTE
This note was copied from a baby's chart.  Plans for discharge home today.  Reports baby is BF well and has been cluster feeding.  Weight loss is 6.75 percent and output is adequate.  Nipples are sore but denies any nipple damage.  Educated ton nipple care, to expect milk supply day 3-5, how to tell if baby is getting enough, symptoms and treatment for engorgement, and info given for [facility].  Encouraged to call for questions or concerns.

## 2025-03-20 ENCOUNTER — MATERNAL SCREENING (OUTPATIENT)
Dept: CALL CENTER | Facility: HOSPITAL | Age: 40
End: 2025-03-20
Payer: COMMERCIAL

## 2025-03-20 NOTE — OUTREACH NOTE
Maternal Screening Survey      Flowsheet Row Responses   Eligibility Eligible   Prep survey completed? Yes   Facility patient discharged from? [facility] - Registered Nurse

## 2025-03-28 ENCOUNTER — MATERNAL SCREENING (OUTPATIENT)
Dept: CALL CENTER | Facility: HOSPITAL | Age: 40
End: 2025-03-28
Payer: COMMERCIAL

## 2025-03-28 NOTE — OUTREACH NOTE
Maternal Screening Survey      Flowsheet Row Responses   Facility patient discharged from? [facility]   Attempt successful? No   Unsuccessful attempts Attempt 1              [facility] - Registered Nurse

## 2025-03-28 NOTE — OUTREACH NOTE
Maternal Screening Survey      Flowsheet Row Responses   Facility patient discharged from? [facility]   Attempt successful? No   Unsuccessful attempts Attempt 2              [facility] - Registered Nurse

## 2025-03-29 ENCOUNTER — MATERNAL SCREENING (OUTPATIENT)
Dept: CALL CENTER | Facility: HOSPITAL | Age: 40
End: 2025-03-29
Payer: COMMERCIAL

## 2025-03-29 NOTE — OUTREACH NOTE
Maternal Screening Survey      Flowsheet Row Responses   Facility patient discharged from? [facility]   Attempt successful? No   Unsuccessful attempts Attempt 3   Revoke Decline to participate   Revoke Decline to participate              [facility] - Registered Nurse